# Patient Record
Sex: MALE | URBAN - METROPOLITAN AREA
[De-identification: names, ages, dates, MRNs, and addresses within clinical notes are randomized per-mention and may not be internally consistent; named-entity substitution may affect disease eponyms.]

---
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
## 2025-01-06 ENCOUNTER — OFFICE VISIT (OUTPATIENT)
Dept: URGENT CARE | Age: 20
End: 2025-01-06
Payer: COMMERCIAL

## 2025-01-06 VITALS
OXYGEN SATURATION: 98 % | DIASTOLIC BLOOD PRESSURE: 74 MMHG | SYSTOLIC BLOOD PRESSURE: 112 MMHG | BODY MASS INDEX: 27.94 KG/M2 | WEIGHT: 189.2 LBS | HEART RATE: 133 BPM

## 2025-01-06 DIAGNOSIS — Z20.822 COVID-19 VIRUS RNA NOT DETECTED: ICD-10-CM

## 2025-01-06 DIAGNOSIS — K29.00 ACUTE GASTRITIS WITHOUT HEMORRHAGE, UNSPECIFIED GASTRITIS TYPE: Primary | ICD-10-CM

## 2025-01-06 DIAGNOSIS — R11.0 NAUSEA: ICD-10-CM

## 2025-01-06 LAB
POC RAPID INFLUENZA A: NEGATIVE
POC RAPID INFLUENZA B: NEGATIVE
POC SARS-COV-2 AG BINAX: NORMAL

## 2025-01-06 PROCEDURE — 99213 OFFICE O/P EST LOW 20 MIN: CPT | Performed by: NURSE PRACTITIONER

## 2025-01-06 PROCEDURE — 87804 INFLUENZA ASSAY W/OPTIC: CPT | Performed by: NURSE PRACTITIONER

## 2025-01-06 PROCEDURE — 87811 SARS-COV-2 COVID19 W/OPTIC: CPT | Performed by: NURSE PRACTITIONER

## 2025-01-06 RX ORDER — FAMOTIDINE 20 MG/1
20 TABLET, FILM COATED ORAL 2 TIMES DAILY
Qty: 14 TABLET | Refills: 0 | Status: SHIPPED | OUTPATIENT
Start: 2025-01-06 | End: 2025-01-13

## 2025-01-06 RX ORDER — DICYCLOMINE HYDROCHLORIDE 20 MG/1
20 TABLET ORAL
Qty: 28 TABLET | Refills: 0 | Status: SHIPPED | OUTPATIENT
Start: 2025-01-06 | End: 2025-01-13

## 2025-01-06 RX ORDER — ONDANSETRON 4 MG/1
4 TABLET, ORALLY DISINTEGRATING ORAL EVERY 8 HOURS PRN
Qty: 20 TABLET | Refills: 0 | Status: SHIPPED | OUTPATIENT
Start: 2025-01-06 | End: 2025-01-13

## 2025-01-06 NOTE — LETTER
January 6, 2025     Patient: Mushtaq Pineda   YOB: 2005   Date of Visit: 1/6/2025       To Whom It May Concern:    It is my medical opinion that Mushtaq Pineda may return to work on 1/8/25 .    If you have any questions or concerns, please don't hesitate to call.         Sincerely,        Yaa Pastor, TOSHIA-CNP    CC: No Recipients

## 2025-01-06 NOTE — PROGRESS NOTES
Subjective   Patient ID: Mushtaq Pineda is a 19 y.o. male. They present today with a chief complaint of Nausea (Vomiting, x2days ).    History of Present Illness  20 yo male coming in for nausea, vomiting, and diarrhea for the last 2 days. He states he does have cramping in his stomach.as well. He denies any fevers or chills. HE denies any blood in his vomit or stool. He denies any other complaints.     Past Medical History  Allergies as of 01/06/2025    (No Known Allergies)       (Not in a hospital admission)       Past Medical History:   Diagnosis Date    Asthma        Past Surgical History:   Procedure Laterality Date    THUMB LT WO          reports current alcohol use. He reports that he does not use drugs.    Review of Systems  Review of Systems:  General: No weight loss, fatigue, anorexia, insomnia, fever, chills.  Cardiac: No chest pain, palpitations, syncope, near syncope.  Pulmonary:  No shortness of breath, cough, hemoptysis  Heme/lymph: No swollen glands, fever, bleeding  GI: No abdominal pain, change in bowel habits, melena, hematemesis, hematochezia, positive nausea, vomiting, and diarrhea  : No discharge, dysuria, frequency, urgency, hematuria  Musculoskeletal: No limb pain, joint pain, joint swelling.  Skin: No rashes  Neuro: No numbness, tingling, headaches                                 Objective    Vitals:    01/06/25 1227   BP: 112/74   Pulse: (!) 133   SpO2: 98%   Weight: 85.8 kg (189 lb 3.2 oz)     No LMP for male patient.    Physical Exam  Physical Exam:  General: Vital noted, no distress. Afebrile  EENT: Eyes unremarkable, Pupils PERRLA, EOMs intact. TMs unremarkable. Posterior oropharynx unremarkable. Uvula in the midline and non-edematous. No PTA. No retropharyngeal mass. No Wilfredo's angina.  Cardiac: Regular rate and rhythm, no murmur  Pulmonary: Lungs clear bilaterally with good aeration. No adventitious breath sounds.  Abdomen: Soft, nontender, nonsurgical. No peritoneal signs.  Normoactive bowel sounds.       Pulse recheck during exam and is 88.     Procedures    Point of Care Test & Imaging Results from this visit  Results for orders placed or performed in visit on 01/06/25   POCT Covid-19 Rapid Antigen   Result Value Ref Range    POC ARLENE-COV-2 AG  Presumptive negative test for SARS-CoV-2 (no antigen detected)     Presumptive negative test for SARS-CoV-2 (no antigen detected)   POCT Influenza A/B manually resulted   Result Value Ref Range    POC Rapid Influenza A Negative Negative    POC Rapid Influenza B Negative Negative      No results found.    Diagnostic study results (if any) were reviewed by LARRY Dwyer.    Assessment/Plan   Allergies, medications, history, and pertinent labs/EKGs/Imaging reviewed by LARRY Dwyer.     Medical Decision Making  Testing: rapid covid and flu  Treatment: Pepcid, zofran, and bentyl prescribed  Differential: 1) gastritis , 2) viral illness , 3) covid  Plan: Patient will follow up with the PCP in the next 2-3 days. Return for any worsening symptoms or go to the ER for further evaluation. Patient understands return precautions and discharge insturctions.  Impression:   1) gastritis      Orders and Diagnoses  Diagnoses and all orders for this visit:  Acute gastritis without hemorrhage, unspecified gastritis type  -     famotidine (Pepcid) 20 mg tablet; Take 1 tablet (20 mg) by mouth 2 times a day for 7 days.  -     ondansetron ODT (Zofran-ODT) 4 mg disintegrating tablet; Dissolve 1 tablet (4 mg) in the mouth every 8 hours if needed for nausea or vomiting for up to 7 days.  -     dicyclomine (Bentyl) 20 mg tablet; Take 1 tablet (20 mg) by mouth 4 times a day before meals for 7 days.  Nausea  -     POCT Covid-19 Rapid Antigen  -     POCT Influenza A/B manually resulted  COVID-19 virus RNA not detected      Medical Admin Record      Patient disposition: Home    Electronically signed by LARRY Dwyer  1:02 PM

## 2025-03-18 ENCOUNTER — OFFICE VISIT (OUTPATIENT)
Dept: URGENT CARE | Age: 20
End: 2025-03-18
Payer: COMMERCIAL

## 2025-03-18 VITALS
OXYGEN SATURATION: 98 % | BODY MASS INDEX: 26.58 KG/M2 | HEART RATE: 93 BPM | SYSTOLIC BLOOD PRESSURE: 127 MMHG | WEIGHT: 180 LBS | DIASTOLIC BLOOD PRESSURE: 69 MMHG | TEMPERATURE: 99.4 F | RESPIRATION RATE: 16 BRPM

## 2025-03-18 DIAGNOSIS — Z20.828 EXPOSURE TO MONONUCLEOSIS SYNDROME: ICD-10-CM

## 2025-03-18 DIAGNOSIS — R11.2 NAUSEA AND VOMITING, UNSPECIFIED VOMITING TYPE: ICD-10-CM

## 2025-03-18 DIAGNOSIS — J02.9 SORETHROAT: Primary | ICD-10-CM

## 2025-03-18 LAB
POC HUMAN RHINOVIRUS PCR: NEGATIVE
POC INFLUENZA A VIRUS PCR: NEGATIVE
POC INFLUENZA B VIRUS PCR: NEGATIVE
POC RAPID MONO: NEGATIVE
POC RESPIRATORY SYNCYTIAL VIRUS PCR: NEGATIVE
POC STREPTOCOCCUS PYOGENES (GROUP A STREP) PCR: NEGATIVE

## 2025-03-18 PROCEDURE — 86308 HETEROPHILE ANTIBODY SCREEN: CPT

## 2025-03-18 PROCEDURE — 99214 OFFICE O/P EST MOD 30 MIN: CPT

## 2025-03-18 PROCEDURE — 87631 RESP VIRUS 3-5 TARGETS: CPT

## 2025-03-18 PROCEDURE — 87651 STREP A DNA AMP PROBE: CPT

## 2025-03-18 RX ORDER — ONDANSETRON 4 MG/1
4 TABLET, ORALLY DISINTEGRATING ORAL EVERY 8 HOURS PRN
Qty: 20 TABLET | Refills: 0 | Status: SHIPPED | OUTPATIENT
Start: 2025-03-18 | End: 2025-03-25

## 2025-03-18 ASSESSMENT — PATIENT HEALTH QUESTIONNAIRE - PHQ9
SUM OF ALL RESPONSES TO PHQ9 QUESTIONS 1 AND 2: 0
2. FEELING DOWN, DEPRESSED OR HOPELESS: NOT AT ALL
1. LITTLE INTEREST OR PLEASURE IN DOING THINGS: NOT AT ALL

## 2025-03-18 ASSESSMENT — ENCOUNTER SYMPTOMS
LOSS OF SENSATION IN FEET: 0
OCCASIONAL FEELINGS OF UNSTEADINESS: 0
DEPRESSION: 0

## 2025-03-18 NOTE — PROGRESS NOTES
Subjective   Patient ID: Mushtaq Pineda is a 19 y.o. male. They present today with a chief complaint of Sore Throat (C/o fatigue,nausea, cough and sore throat for 3 days now.).        Past Medical History  Allergies as of 03/18/2025 - Reviewed 03/18/2025   Allergen Reaction Noted    Egg Other 03/05/2025       (Not in a hospital admission)       Past Medical History:   Diagnosis Date    Asthma        Past Surgical History:   Procedure Laterality Date    THUMB LT WO          reports current alcohol use. He reports that he does not use drugs.    Review of Systems  Review of Systems                               Objective    Vitals:    03/18/25 1421   BP: 127/69   BP Location: Left arm   Patient Position: Sitting   BP Cuff Size: Large adult   Pulse: 93   Resp: 16   Temp: 37.4 °C (99.4 °F)   TempSrc: Oral   SpO2: 98%   Weight: 81.6 kg (180 lb)     No LMP for male patient.    Physical Exam  Vitals reviewed.   Constitutional:       General: He is not in acute distress.  HENT:      Head: Normocephalic and atraumatic.      Right Ear: Tympanic membrane and ear canal normal. No tenderness.      Left Ear: Tympanic membrane and ear canal normal. No tenderness.      Nose: Nose normal. No congestion.      Mouth/Throat:      Mouth: Mucous membranes are moist.      Pharynx: Posterior oropharyngeal erythema present.   Eyes:      Extraocular Movements: Extraocular movements intact.      Conjunctiva/sclera: Conjunctivae normal.   Cardiovascular:      Rate and Rhythm: Normal rate and regular rhythm.      Heart sounds: No murmur heard.  Pulmonary:      Effort: Pulmonary effort is normal.      Breath sounds: Normal breath sounds. No decreased breath sounds, wheezing, rhonchi or rales.   Skin:     General: Skin is warm.   Neurological:      Mental Status: He is alert and oriented to person, place, and time.   Psychiatric:         Mood and Affect: Mood normal.         Behavior: Behavior normal.             Point of Care Test & Imaging  Results from this visit  No results found for this visit on 03/18/25.   No results found.    Diagnostic study results (if any) were reviewed by Niyah Schultz PA-C.    Assessment/Plan   Allergies, medications, history, and pertinent labs/EKGs/Imaging reviewed by Niyah Schultz PA-C.     Medical Decision Making  Negative strep pcr, flu, rhinovirus, RSV and monospot. Pt was exposed to mono through his significant other. Will start on zofran as needed for N/V.   -         Patient is educated about their diagnoses.     -          Discussed medications benefits and adverse effects.     -          Answered all patient’s questions.     -          Patient will call 911 or go to the nearest ED if worsen symptoms .     -          Patient is agreeable to the plan of care and is deemed stable upon discharge.     -          Follow up with your primary care provider in two days.      Orders and Diagnoses  Diagnoses and all orders for this visit:  Sorethroat  -     POCT SPOTFIRE R/ST Panel Mini w/Strep A ([facility]) manually resulted  -     POCT Infectious mononucleosis antibody manually resulted  Exposure to mononucleosis syndrome  Nausea and vomiting, unspecified vomiting type  -     ondansetron ODT (Zofran-ODT) 4 mg disintegrating tablet; Dissolve 1 tablet (4 mg) in the mouth every 8 hours if needed for nausea or vomiting for up to 7 days.      Medical Admin Record      Patient disposition: Home    Electronically signed by Niyah Schultz PA-C  3:24 PM

## 2025-03-18 NOTE — LETTER
March 18, 2025     Patient: Mushtaq Pineda   YOB: 2005   Date of Visit: 3/18/2025       To Whom It May Concern:    Mushtaq Pineda was seen in my clinic on 3/18/2025. Please excuse Mushtaq for his absence from work on this day to make the appointment. Patient may return to work March 20, 2025    If you have any questions or concerns, please don't hesitate to call.         Sincerely,         Niyah Schultz PA-C

## 2025-04-24 ENCOUNTER — APPOINTMENT (OUTPATIENT)
Dept: PRIMARY CARE | Facility: CLINIC | Age: 20
End: 2025-04-24
Payer: COMMERCIAL

## 2025-04-25 ENCOUNTER — OFFICE VISIT (OUTPATIENT)
Dept: URGENT CARE | Age: 20
End: 2025-04-25
Payer: COMMERCIAL

## 2025-04-25 VITALS
HEART RATE: 105 BPM | RESPIRATION RATE: 20 BRPM | TEMPERATURE: 98.3 F | OXYGEN SATURATION: 95 % | DIASTOLIC BLOOD PRESSURE: 75 MMHG | SYSTOLIC BLOOD PRESSURE: 112 MMHG

## 2025-04-25 DIAGNOSIS — J02.9 SORE THROAT: ICD-10-CM

## 2025-04-25 DIAGNOSIS — R11.0 NAUSEA: ICD-10-CM

## 2025-04-25 DIAGNOSIS — Z20.828 EXPOSURE TO MONONUCLEOSIS SYNDROME: Primary | ICD-10-CM

## 2025-04-25 LAB
POC HUMAN RHINOVIRUS PCR: NEGATIVE
POC INFLUENZA A VIRUS PCR: NEGATIVE
POC INFLUENZA B VIRUS PCR: NEGATIVE
POC RESPIRATORY SYNCYTIAL VIRUS PCR: NEGATIVE
POC STREPTOCOCCUS PYOGENES (GROUP A STREP) PCR: NEGATIVE

## 2025-04-25 PROCEDURE — 87631 RESP VIRUS 3-5 TARGETS: CPT

## 2025-04-25 PROCEDURE — 87651 STREP A DNA AMP PROBE: CPT

## 2025-04-25 PROCEDURE — 99213 OFFICE O/P EST LOW 20 MIN: CPT

## 2025-04-25 RX ORDER — METHYLPREDNISOLONE 4 MG/1
TABLET ORAL
Qty: 21 TABLET | Refills: 0 | Status: SHIPPED | OUTPATIENT
Start: 2025-04-25 | End: 2025-05-01

## 2025-04-25 RX ORDER — ONDANSETRON 4 MG/1
4 TABLET, FILM COATED ORAL EVERY 8 HOURS PRN
Qty: 10 TABLET | Refills: 0 | Status: SHIPPED | OUTPATIENT
Start: 2025-04-25 | End: 2025-05-02

## 2025-04-25 ASSESSMENT — ENCOUNTER SYMPTOMS
MYALGIAS: 1
FEVER: 0
CONSTIPATION: 0
VOMITING: 0
CHEST TIGHTNESS: 0
TROUBLE SWALLOWING: 0
NAUSEA: 1
SHORTNESS OF BREATH: 0
DIARRHEA: 0
SORE THROAT: 1
WHEEZING: 0
CHILLS: 0
FATIGUE: 1
RHINORRHEA: 0
COUGH: 0
PALPITATIONS: 0

## 2025-04-25 NOTE — PROGRESS NOTES
Subjective   Patient ID: Mushtaq Pineda is a 19 y.o. male. They present today with a chief complaint of Fatigue (Pt advised that he tested positive for Mono 4-5 weeks ago. Pt advised that he has had increase Fatigue, Nausea, and Myalgia recently. ).    History of Present Illness  Patient has been continuing supportive measures for about 4-5 weeks. He was last seen for suspected mono as partner was positive as well. He presents today with continued fatigue, nausea, body aches and sore throat. Denies vomiting. Denies chest pain, shortness of breath. Denies symptoms of cough. Denies fever. Patient requires a work note. Denies any abdominal pain or rashes.           Past Medical History  Allergies as of 04/25/2025 - Reviewed 04/25/2025   Allergen Reaction Noted    Egg Other 03/05/2025       Prescriptions Prior to Admission[1]     Medical History[2]    Surgical History[3]     reports current alcohol use. He reports that he does not use drugs.    Review of Systems  Review of Systems   Constitutional:  Positive for fatigue. Negative for chills and fever.   HENT:  Positive for sore throat. Negative for congestion, ear pain, postnasal drip, rhinorrhea and trouble swallowing.    Respiratory:  Negative for cough, chest tightness, shortness of breath and wheezing.    Cardiovascular:  Negative for chest pain and palpitations.   Gastrointestinal:  Positive for nausea. Negative for constipation, diarrhea and vomiting.   Musculoskeletal:  Positive for myalgias.   Skin:  Negative for rash.                                  Objective    Vitals:    04/25/25 1419   BP: 112/75   Pulse: 105   Resp: 20   Temp: 36.8 °C (98.3 °F)   SpO2: 95%     No LMP for male patient.    Physical Exam  Constitutional:       General: He is not in acute distress.     Appearance: He is normal weight. He is not toxic-appearing.   HENT:      Right Ear: Tympanic membrane and external ear normal.      Left Ear: Tympanic membrane and external ear normal.       Nose: Nose normal.      Mouth/Throat:      Mouth: Mucous membranes are moist.      Pharynx: Uvula midline. Oropharyngeal exudate and posterior oropharyngeal erythema present.      Tonsils: Tonsillar exudate present. No tonsillar abscesses.   Cardiovascular:      Rate and Rhythm: Normal rate and regular rhythm.   Pulmonary:      Effort: Pulmonary effort is normal. No respiratory distress.      Breath sounds: Normal breath sounds. No wheezing.   Musculoskeletal:      Cervical back: Normal range of motion.   Lymphadenopathy:      Cervical: Cervical adenopathy present.      Right cervical: Superficial cervical adenopathy present.      Left cervical: Superficial cervical adenopathy present.   Neurological:      Mental Status: He is alert.         Procedures    Point of Care Test & Imaging Results from this visit  Results for orders placed or performed in visit on 04/25/25   POCT SPOTFIRE R/ST Panel Mini w/Strep A ([facility]) manually resulted   Result Value Ref Range    POC Group A Strep, PCR Negative Negative    POC Respiratory Syncytial Virus PCR Negative Negative    POC Influenza A Virus PCR Negative Negative    POC Influenza B Virus PCR Negative Negative    POC Human Rhinovirus PCR Negative Negative      Imaging  No results found.    Cardiology, Vascular, and Other Imaging  No other imaging results found for the past 2 days      Diagnostic study results (if any) were reviewed by Brianna Cox PA-C.    Assessment/Plan   Allergies, medications, history, and pertinent labs/EKGs/Imaging reviewed by Brianna Cox PA-C.     Medical Decision Making  MDM - Suspected mono due to ongoing symptoms and exposure from last visit. All other testing negative today. No signs of sepsis or signs of other infection. Will treat supportive, follow up with PCP. Was advised to return if symptoms get worse, otherwise recommended follow up with pcp in the next 2 weeks if symptoms continue. Any abdominal trauma, abdominal  pain/bruising go to the ER immediately. Patient verbalized understanding and agrees with plan.       Orders and Diagnoses  Diagnoses and all orders for this visit:  Exposure to mononucleosis syndrome  -     methylPREDNISolone (Medrol Dospak) 4 mg tablets; Take as directed on package.  Sore throat  -     POCT SPOTFIRE R/ST Panel Mini w/Strep A ([facility]) manually resulted  Nausea  -     ondansetron (Zofran) 4 mg tablet; Take 1 tablet (4 mg) by mouth every 8 hours if needed for nausea or vomiting for up to 7 days.      Medical Admin Record      Patient disposition: Home    Electronically signed by Brianna Cox PA-C  3:01 PM           [1] (Not in a hospital admission)   [2]   Past Medical History:  Diagnosis Date    Asthma    [3]   Past Surgical History:  Procedure Laterality Date    THUMB LT WO

## 2025-04-25 NOTE — PATIENT INSTRUCTIONS
Negative pcr strep, rsv, flu, and rhinovirus    No signs of sepsis or signs of other infection. Will treat supportive, follow up with PCP. Was advised to return if symptoms get worse.  Any abdominal trauma, abdominal pain/bruising go to the ER immediately.     Recommended follow up with primary care in 1-2 weeks.

## 2025-04-25 NOTE — LETTER
April 25, 2025     Patient: Mushtaq Pineda   YOB: 2005   Date of Visit: 4/25/2025       To Whom It May Concern:    Mushtaq Pineda was seen in my clinic on 4/25/2025 at 2:05 pm. Please excuse Mushtaq for his absence from work. May return 4/28/25.        Sincerely,         Brianna Cox PA-C        CC: No Recipients